# Patient Record
Sex: FEMALE | Race: WHITE
[De-identification: names, ages, dates, MRNs, and addresses within clinical notes are randomized per-mention and may not be internally consistent; named-entity substitution may affect disease eponyms.]

---

## 2018-04-25 ENCOUNTER — HOSPITAL ENCOUNTER (EMERGENCY)
Dept: HOSPITAL 45 - C.EDB | Age: 18
Discharge: HOME | End: 2018-04-25
Payer: COMMERCIAL

## 2018-04-25 VITALS
WEIGHT: 184.31 LBS | BODY MASS INDEX: 29.62 KG/M2 | HEIGHT: 65.98 IN | HEIGHT: 65.98 IN | BODY MASS INDEX: 29.62 KG/M2 | WEIGHT: 184.31 LBS

## 2018-04-25 VITALS — TEMPERATURE: 98.06 F

## 2018-04-25 VITALS — OXYGEN SATURATION: 98 % | DIASTOLIC BLOOD PRESSURE: 80 MMHG | SYSTOLIC BLOOD PRESSURE: 122 MMHG | HEART RATE: 83 BPM

## 2018-04-25 DIAGNOSIS — H72.91: ICD-10-CM

## 2018-04-25 DIAGNOSIS — H66.91: Primary | ICD-10-CM

## 2018-04-25 NOTE — EMERGENCY ROOM VISIT NOTE
History


First contact with patient:  19:55


Chief Complaint:  OTHER COMPLAINT


Stated Complaint:  LOST HEARING IN RIGHT EAR





History of Present Illness


The patient is a 17 year old female who presents to the Emergency Room with 

complaints of severe right ear pain and decreased hearing.  The patient started 

with an earache last week while she was camping.  She denies any swimming or 

significant altitude changes.  2 days ago, the patient had a sudden onset of 

pain and hearing loss.  She denies any trauma to the ear.  No drainage.  The 

patient was seen at [facility].  She was told that she had 

fluid behind the ear, and there is not much they could do.  She denies any 

fever or chills.  No sore throat or other associated symptoms.





Review of Systems


6 system review performed and  negative unless noted in HPI or below





Past Medical/Surgical History


Otherwise healthy





Social History


Smoking Status:  Never Smoker





Current/Historical Medications


Scheduled


Amoxicillin (Amoxil), 875 MG PO TID





Scheduled PRN


Hydrocodone/Acetaminophen 5MG/325MG (Norco 5MG/325MG), 1-2 TABLET PO Q4H PRN 

for Pain





Physical Exam


Vital Signs











  Date Time  Temp Pulse Resp B/P (MAP) Pulse Ox O2 Delivery O2 Flow Rate FiO2


 


4/25/18 21:09  83 16 122/80 98   


 


4/25/18 19:42 36.7 106 18 127/75 98 Room Air  











Physical Exam


GENERAL: 17-year-old female, in moderate discomfort,, in no acute distress, 

nondiaphoretic, well-developed well-nourished.


SKIN: The skin was without rashes, erythema, edema, or bruising. 


HEAD: Normocephalic atraumatic.  


EARS: External auditory canals clear, right tympanic membrane is opaque with a 

purulent effusion.  There is a questionable 2 mm, perforation at approximately 4

:00.  No active drainage.  Mild tragal tenderness noted.  No tenderness over 

the mastoid.  Left external auditory canal is clear.  Left TM pearly gray 

without effusion


EYES: Pupils equal round and reactive to light and accommodation.  Conjunctivae 

without injection, sclerae without icterus.  Extraocular movements intact.  


NOSE: Patent, turbinates without inflammation or discharge.  No sinus 

tenderness.


MOUTH: Mucous membranes moist.  Tonsils are not enlarged.  Pharynx without 

erythema or exudate.  Uvula midline.  Airway patent.  Tongue does not deviate.  


NECK: Supple without nuchal rigidity.  No lymphadenopathy. Cervical spine is 

nontender.  No JVD.


HEART: Regular rate and rhythm without murmurs gallops or rubs.


LUNGS: Clear to auscultation bilaterally without wheezes, rales or rhonchi.   

No accessory muscle use.


ABDOMEN: Positive bowel sounds x 4.Soft, nontender, without organomegaly. No 

guarding or rebound tenderness.


MUSCULOSKELETAL: No muscle atrophy, erythema, or edema noted.  Full range of 

motion in all extremities.  No tenderness to palpation.  Normal gait.  Strength 

5/5 throughout.


NEURO: Patient was alert and oriented to person place and time.  Normal 

sensation to touch. No focal neurological deficits.





Medical Decision & Procedures


Medications Administered











 Medications


  (Trade)  Dose


 Ordered  Sig/Laura


 Route  Start Time


 Stop Time Status Last Admin


Dose Admin


 


 Amoxicillin


  (Amoxil Cap)  1,000 mg  NOW  ONCE


 PO  4/25/18 20:15


 4/25/18 20:16 DC 4/25/18 20:27


1,000 MG


 


 Acetaminophen/


 Hydrocodone Bitart


  (Norco 5/325mg


 Home Pack)  1 homepack  UD  ONCE


 PO  4/25/18 20:15


 4/25/18 20:16 DC 4/25/18 20:28


1 HOMEPACK


 


 Ofloxacin


  (Ocuflox 0.3%


 Oph Soln)  5 drops  ONE  STAT


 OTR  4/25/18 20:15


 4/25/18 20:16 DC 4/25/18 20:26


5 DROPS











ED Course


The patient was seen and examined


She was given 1 dose of amoxicillin


Floxin drops were applied to the right ear


The patient was given a home pack of Norco


Discharge instructions were reviewed, and she was discharged in good condition





Medical Decision


Differential diagnosis: Otitis externa, media, TM perforation, mastoiditis





This patient is a 17-year-old female that presents to the emergency department 

with ear pain and sudden loss of hearing.  On exam, she had a purulent 

effusion.  There is also questionable perforation.  For this reason, the 

patient will be covered with oral and otic antibiotics.  She was given a 

prescription for Norco for pain control.  Case management will make her a follow

-up appointment with ENT in the morning.  She was comfortable with this plan, 

and discharged in good condition





This chart was completed in part utilizing Dragon Speech Voice Recognition 

software. Attempts were made to minimize the grammatical errors, random word 

insertions, pronoun errors and incomplete sentences. Any formal questions or 

concerns about the content, text or information contained within the body of 

this dictation should be directly addressed to the provider for clarification.





Medication Reconcilliation


Current Medication List:  was personally reviewed by me





Blood Pressure Screening


Patient's blood pressure:  Normal blood pressure





Impression





 Primary Impression:  


 Otitis media


 Additional Impression:  


 Tympanic membrane rupture





Departure Information


Dispostion


Home / Self-Care





Condition


GOOD





Prescriptions





Hydrocodone/Acetaminophen 5MG/325MG (Norco 5MG/325MG)  Tab


1-2 TABLET PO Q4H Y for Pain, #15 TAB


   For Initial Treatment


   Prov: Krys Bailey PA-C         4/25/18 


Amoxicillin (AMOXIL) 875 Mg Tab


875 MG PO TID, #10 TAB


   Prov: Krys Bailey PA-C         4/25/18





Referrals


[facility] (PCP)








Isabella Estevez M.D.





Patient Instructions


My [facility]





Additional Instructions





You were evaluated in the emergency department for decreased hearing and ear 

pain.  This is possibly due to a ruptured eardrum.  Please do Floxin drops 5 

drops to the ear twice daily for 10 days





Please follow-up with the ENT doctor as scheduled





Ibuprofen 600 mg every 6 hours


Norco  1-2 tabs every 4 hours for severe pain.  Do not drink alcohol or drive  

while taking this medication.  This may be taken with ibuprofen, but avoid 

Tylenol.





Please take the entire course of antibiotics








Please also follow-up with [facility] for recheck








Do not hesitate to return to the emergency department with any new, worsening 

or concerning symptom





Problem Qualifiers